# Patient Record
Sex: FEMALE | Race: WHITE | NOT HISPANIC OR LATINO | Employment: OTHER | URBAN - METROPOLITAN AREA
[De-identification: names, ages, dates, MRNs, and addresses within clinical notes are randomized per-mention and may not be internally consistent; named-entity substitution may affect disease eponyms.]

---

## 2017-07-07 ENCOUNTER — HOSPITAL ENCOUNTER (OUTPATIENT)
Dept: URGENT CARE | Facility: CLINIC | Age: 82
Discharge: HOME | End: 2017-07-07
Attending: FAMILY MEDICINE | Admitting: FAMILY MEDICINE

## 2020-02-13 ENCOUNTER — OUTSIDE FACILITY SERVICE (OUTPATIENT)
Dept: CARDIOLOGY | Facility: CLINIC | Age: 85
End: 2020-02-13

## 2020-02-13 PROCEDURE — 33208 INSRT HEART PM ATRIAL & VENT: CPT | Performed by: INTERNAL MEDICINE

## 2020-02-20 ENCOUNTER — CLINICAL SUPPORT NO REQUIREMENTS (OUTPATIENT)
Dept: CARDIOLOGY | Facility: CLINIC | Age: 85
End: 2020-02-20

## 2020-02-20 DIAGNOSIS — I49.5 SICK SINUS SYNDROME (HCC): Primary | ICD-10-CM

## 2020-02-20 PROCEDURE — 93280 PM DEVICE PROGR EVAL DUAL: CPT | Performed by: INTERNAL MEDICINE

## 2020-02-20 PROCEDURE — 99024 POSTOP FOLLOW-UP VISIT: CPT | Performed by: NURSE PRACTITIONER

## 2020-02-20 NOTE — PROGRESS NOTES
Cardiology Office Implant follow up      Reason for f/u: Incision check and device interrogation      Dear Dr. Greer, No Known and colleagues:    It was nice to see Emily Solano in follow up today 1 weeks after device implant.  As you know, she is a 84 y.o. female of Dr. Ballesteros with symptomatic sick sinus syndrome, recurrent atrial fibrillation, sinus pauses with syncope who underwent dual-chamber permanent pacemaker with right atrial lead and right ventricular pacing lead at [facility] by Dr. Schultz on 2/13/2020. The device was interrogated and has normal functionality, see attached for specifics.  Incision left chest wall is well approximated without erythema, edema, or drainage.        Past Medical History:   Diagnosis Date   • A-fib (CMS/HCC)    • Coronary artery disease    • Hyperlipidemia    • Hypertension        Past Surgical History:   Procedure Laterality Date   • BREAST LUMPECTOMY Right    • HYSTERECTOMY           Current Outpatient Medications:   •  benzonatate (TESSALON) 200 MG capsule, Take 1 capsule by mouth 3 (Three) Times a Day As Needed for Cough., Disp: 30 capsule, Rfl: 0  •  ELIQUIS 2.5 MG tablet tablet, Take 2.5 mg by mouth 2 (Two) Times a Day., Disp: , Rfl:   •  hydroCHLOROthiazide (HYDRODIURIL) 12.5 MG tablet, Take 12.5 mg by mouth Daily., Disp: , Rfl:   •  sotalol (BETAPACE) 80 MG tablet, 40 mg., Disp: , Rfl:     Social History     Socioeconomic History   • Marital status:      Spouse name: Not on file   • Number of children: Not on file   • Years of education: Not on file   • Highest education level: Not on file   Tobacco Use   • Smoking status: Never Smoker   • Smokeless tobacco: Never Used   Substance and Sexual Activity   • Alcohol use: Never     Frequency: Never   • Drug use: Never       No family history on file.          ROS  There were no vitals taken for this visit..  Objective     Physical Exam      Procedures          Assessment/Plan:    Return to office  3 weeks for 1 month post implant check.      Patient received verbal instruction to leave open to air, leave steri strips in place (they will fall off on their own).  May shower after 1 week from implant but cover with waterproof dressing until 2 weeks from implant.  Do not lift more than 5 lbs, excessive movement, or raise arm above head with surgical arm until 1 month from implant.       NATASHA Peralta  EP cardiology

## 2020-02-26 ENCOUNTER — OFFICE (OUTPATIENT)
Dept: URBAN - METROPOLITAN AREA CLINIC 64 | Facility: CLINIC | Age: 85
End: 2020-02-26
Payer: OTHER GOVERNMENT

## 2020-02-26 VITALS
WEIGHT: 137 LBS | SYSTOLIC BLOOD PRESSURE: 164 MMHG | DIASTOLIC BLOOD PRESSURE: 82 MMHG | HEART RATE: 70 BPM | HEIGHT: 64 IN

## 2020-02-26 DIAGNOSIS — K21.9 GASTRO-ESOPHAGEAL REFLUX DISEASE WITHOUT ESOPHAGITIS: ICD-10-CM

## 2020-02-26 DIAGNOSIS — R11.2 NAUSEA WITH VOMITING, UNSPECIFIED: ICD-10-CM

## 2020-02-26 DIAGNOSIS — Z79.01 LONG TERM (CURRENT) USE OF ANTICOAGULANTS: ICD-10-CM

## 2020-02-26 DIAGNOSIS — Z95.0 PRESENCE OF CARDIAC PACEMAKER: ICD-10-CM

## 2020-02-26 PROCEDURE — 99202 OFFICE O/P NEW SF 15 MIN: CPT | Performed by: NURSE PRACTITIONER

## 2020-02-26 RX ORDER — ONDANSETRON HYDROCHLORIDE 4 MG/1
16 TABLET, FILM COATED ORAL
Qty: 40 | Refills: 1 | Status: COMPLETED
Start: 2020-02-26 | End: 2020-08-06

## 2020-02-26 NOTE — SERVICEHPINOTES
Pt is an 83yo female with hx of afib on eliquis, cardiac pacemaker placement 2 weeks ago, and colon polyps in 1970's who presents in consultation from Dr. Agosto for evaluation of gerd, can be mild to moderate, did not improve with any otc acid reducers in the past.  she thinks she was told she doesn't make enough acid in the past.  she is unsure of specific aggravating or alleviating factors.  For at least a few years she c/o mild to moderate intermittent dysphagia with all consistency of oral intake, is unsure of alleviating factors, thinks it is worse with apples.  Sometimes she c/o nausea with bilious emesis after food feels like it has gotten stuck.  Denies prior egd or esophageal dilation.  No hematemesis.  No abd pain.  She is having regular bm with occasional constipation.  No fever or wt loss.Pt's son has barretts esophagus.BR

## 2020-03-12 ENCOUNTER — CLINICAL SUPPORT NO REQUIREMENTS (OUTPATIENT)
Dept: CARDIOLOGY | Facility: CLINIC | Age: 85
End: 2020-03-12

## 2020-03-12 DIAGNOSIS — I49.5 SICK SINUS SYNDROME (HCC): Primary | ICD-10-CM

## 2020-03-12 PROCEDURE — 99024 POSTOP FOLLOW-UP VISIT: CPT | Performed by: INTERNAL MEDICINE

## 2020-03-12 PROCEDURE — 93288 INTERROG EVL PM/LDLS PM IP: CPT | Performed by: INTERNAL MEDICINE

## 2020-03-12 NOTE — PROGRESS NOTES
Patient here for one month incision check. S/P [employer] dual pacemaker implanted on 2/13/20. Steri strips removed. Incision intact, clean, dry, no signs of infection. Patient made aware she can to her normal daily activities. Next appointment already scheduled. Patient verbalized understanding. In clinic interrogation performed with assistance of rep. See scanned report.  NO changes made and NO episodes.

## 2020-03-16 ENCOUNTER — CLINICAL SUPPORT NO REQUIREMENTS (OUTPATIENT)
Dept: CARDIOLOGY | Facility: CLINIC | Age: 85
End: 2020-03-16

## 2020-03-16 DIAGNOSIS — I49.5 SICK SINUS SYNDROME (HCC): Primary | ICD-10-CM

## 2020-04-23 RX ORDER — DILTIAZEM HYDROCHLORIDE 240 MG/1
240 CAPSULE, COATED, EXTENDED RELEASE ORAL EVERY MORNING
COMMUNITY
End: 2021-01-11

## 2020-04-23 RX ORDER — LOSARTAN POTASSIUM 100 MG/1
50 TABLET ORAL DAILY
COMMUNITY
End: 2021-01-11

## 2020-04-29 ENCOUNTER — ANESTHESIA EVENT (OUTPATIENT)
Dept: GASTROENTEROLOGY | Facility: HOSPITAL | Age: 85
End: 2020-04-29

## 2020-04-30 ENCOUNTER — ANESTHESIA (OUTPATIENT)
Dept: GASTROENTEROLOGY | Facility: HOSPITAL | Age: 85
End: 2020-04-30

## 2020-04-30 ENCOUNTER — HOSPITAL ENCOUNTER (OUTPATIENT)
Facility: HOSPITAL | Age: 85
Setting detail: HOSPITAL OUTPATIENT SURGERY
Discharge: HOME | End: 2020-04-30
Attending: INTERNAL MEDICINE | Admitting: INTERNAL MEDICINE

## 2020-04-30 ENCOUNTER — ON CAMPUS - OUTPATIENT (OUTPATIENT)
Dept: URBAN - METROPOLITAN AREA HOSPITAL 85 | Facility: HOSPITAL | Age: 85
End: 2020-04-30
Payer: OTHER GOVERNMENT

## 2020-04-30 VITALS
DIASTOLIC BLOOD PRESSURE: 64 MMHG | HEART RATE: 61 BPM | RESPIRATION RATE: 12 BRPM | SYSTOLIC BLOOD PRESSURE: 112 MMHG | TEMPERATURE: 97.6 F | OXYGEN SATURATION: 95 % | BODY MASS INDEX: 21.66 KG/M2 | HEIGHT: 65 IN | WEIGHT: 130 LBS

## 2020-04-30 DIAGNOSIS — R13.10 DYSPHAGIA, UNSPECIFIED: ICD-10-CM

## 2020-04-30 DIAGNOSIS — K21.0 GASTRO-ESOPHAGEAL REFLUX DISEASE WITH ESOPHAGITIS: ICD-10-CM

## 2020-04-30 DIAGNOSIS — R11.2 NAUSEA WITH VOMITING, UNSPECIFIED: ICD-10-CM

## 2020-04-30 PROCEDURE — 43235 EGD DIAGNOSTIC BRUSH WASH: CPT | Performed by: NURSE PRACTITIONER

## 2020-04-30 PROCEDURE — 43450 DILATE ESOPHAGUS 1/MULT PASS: CPT | Performed by: NURSE PRACTITIONER

## 2020-04-30 PROCEDURE — 25010000002 PROPOFOL 10 MG/ML EMULSION: Performed by: ANESTHESIOLOGY

## 2020-04-30 RX ORDER — SODIUM CHLORIDE 9 MG/ML
1000 INJECTION, SOLUTION INTRAVENOUS CONTINUOUS
Status: DISCONTINUED | OUTPATIENT
Start: 2020-04-30 | End: 2020-04-30 | Stop reason: HOSPADM

## 2020-04-30 RX ORDER — PROPOFOL 10 MG/ML
VIAL (ML) INTRAVENOUS AS NEEDED
Status: DISCONTINUED | OUTPATIENT
Start: 2020-04-30 | End: 2020-04-30 | Stop reason: SURG

## 2020-04-30 RX ORDER — LIDOCAINE HYDROCHLORIDE 10 MG/ML
0.5 INJECTION, SOLUTION INFILTRATION; PERINEURAL ONCE AS NEEDED
Status: DISCONTINUED | OUTPATIENT
Start: 2020-04-30 | End: 2020-04-30 | Stop reason: HOSPADM

## 2020-04-30 RX ORDER — SODIUM CHLORIDE 0.9 % (FLUSH) 0.9 %
10 SYRINGE (ML) INJECTION AS NEEDED
Status: DISCONTINUED | OUTPATIENT
Start: 2020-04-30 | End: 2020-04-30 | Stop reason: HOSPADM

## 2020-04-30 RX ORDER — SODIUM CHLORIDE 0.9 % (FLUSH) 0.9 %
3 SYRINGE (ML) INJECTION EVERY 12 HOURS SCHEDULED
Status: DISCONTINUED | OUTPATIENT
Start: 2020-04-30 | End: 2020-04-30 | Stop reason: HOSPADM

## 2020-04-30 RX ADMIN — PROPOFOL 100 MG: 10 INJECTION, EMULSION INTRAVENOUS at 09:52

## 2020-04-30 RX ADMIN — SODIUM CHLORIDE 1000 ML: 0.9 INJECTION, SOLUTION INTRAVENOUS at 08:57

## 2020-04-30 NOTE — ANESTHESIA PREPROCEDURE EVALUATION
Anesthesia Evaluation     Patient summary reviewed and Nursing notes reviewed   no history of anesthetic complications:  NPO Solid Status: > 8 hours  NPO Liquid Status: > 8 hours           Airway   Mallampati: II  TM distance: >3 FB  Neck ROM: full  No difficulty expected  Dental      Pulmonary - normal exam   (+) shortness of breath,   Cardiovascular - normal exam    (+) pacemaker pacemaker interrogated unknown, hypertension, CAD, dysrhythmias (sick sinus syndrome),       Neuro/Psych- negative ROS  GI/Hepatic/Renal/Endo - negative ROS     Musculoskeletal (-) negative ROS    Abdominal  - normal exam   Substance History - negative use     OB/GYN negative ob/gyn ROS         Other      history of cancer remission                    Anesthesia Plan    ASA 3     MAC     intravenous induction     Anesthetic plan, all risks, benefits, and alternatives have been provided, discussed and informed consent has been obtained with: patient.

## 2020-04-30 NOTE — ANESTHESIA POSTPROCEDURE EVALUATION
Patient: Emily Solano    Procedure Summary     Date:  04/30/20 Room / Location:  [facility] ENDOSCOPY 4 / [facility] ENDOSCOPY    Anesthesia Start:  0949 Anesthesia Stop:  1004    Procedure:  ESOPHAGOGASTRODUODENOSCOPY WITH DILATATION (BOUGIE #48,52) (N/A ) Diagnosis:       GERD (gastroesophageal reflux disease)      Dysphagia      Nausea with vomiting      Long term (current) use of anticoagulants      Cardiac pacemaker in situ      (GERD, DYSPHAGIA, N/V, LONG TERM USE OF ANTICOAGULANTS, CARDIAC PACEMAKER IS SITU)    Surgeon:  Jeovanny Jonas MD Provider:  Isac Henderson MD    Anesthesia Type:  MAC ASA Status:  3          Anesthesia Type: MAC    Vitals  Vitals Value Taken Time   /64 4/30/2020 10:22 AM   Temp     Pulse 61 4/30/2020 10:22 AM   Resp 12 4/30/2020 10:22 AM   SpO2 95 % 4/30/2020 10:22 AM           Post Anesthesia Care and Evaluation    Patient location during evaluation: PACU  Patient participation: complete - patient participated  Level of consciousness: awake  Pain scale: See nurse's notes for pain score.  Pain management: adequate  Airway patency: patent  Anesthetic complications: No anesthetic complications  PONV Status: none  Cardiovascular status: acceptable  Respiratory status: acceptable  Hydration status: acceptable    Comments: Patient seen and examined postoperatively; vital signs stable; SpO2 greater than or equal to 90%; cardiopulmonary status stable; nausea/vomiting adequately controlled; pain adequately controlled; no apparent anesthesia complications; patient discharged from anesthesia care when discharge criteria were met

## 2020-05-04 ENCOUNTER — OFFICE VISIT (OUTPATIENT)
Dept: CARDIOLOGY | Facility: CLINIC | Age: 85
End: 2020-05-04

## 2020-05-04 ENCOUNTER — CLINICAL SUPPORT NO REQUIREMENTS (OUTPATIENT)
Dept: CARDIOLOGY | Facility: CLINIC | Age: 85
End: 2020-05-04

## 2020-05-04 VITALS
WEIGHT: 137 LBS | DIASTOLIC BLOOD PRESSURE: 90 MMHG | OXYGEN SATURATION: 98 % | BODY MASS INDEX: 23.15 KG/M2 | HEART RATE: 65 BPM | SYSTOLIC BLOOD PRESSURE: 165 MMHG

## 2020-05-04 DIAGNOSIS — I49.5 SICK SINUS SYNDROME (HCC): Primary | ICD-10-CM

## 2020-05-04 DIAGNOSIS — I10 ESSENTIAL HYPERTENSION: ICD-10-CM

## 2020-05-04 DIAGNOSIS — I50.30 DIASTOLIC CONGESTIVE HEART FAILURE, UNSPECIFIED HF CHRONICITY (HCC): ICD-10-CM

## 2020-05-04 DIAGNOSIS — Z95.0 PRESENCE OF CARDIAC PACEMAKER: ICD-10-CM

## 2020-05-04 PROCEDURE — 93280 PM DEVICE PROGR EVAL DUAL: CPT | Performed by: INTERNAL MEDICINE

## 2020-05-04 PROCEDURE — 99214 OFFICE O/P EST MOD 30 MIN: CPT | Performed by: INTERNAL MEDICINE

## 2020-05-04 RX ORDER — TRIAMTERENE AND HYDROCHLOROTHIAZIDE 75; 50 MG/1; MG/1
0.5 TABLET ORAL DAILY
Qty: 30 TABLET | Refills: 5 | Status: SHIPPED | OUTPATIENT
Start: 2020-05-04 | End: 2020-08-03

## 2020-05-04 RX ORDER — SOTALOL HYDROCHLORIDE 80 MG/1
80 TABLET ORAL 2 TIMES DAILY
Qty: 60 TABLET | Refills: 5 | Status: SHIPPED | OUTPATIENT
Start: 2020-05-04 | End: 2020-06-03

## 2020-05-04 NOTE — PROGRESS NOTES
CC--sick sinus syndrome, paroxysmal atrial fibrillation, hypertension    Sub--84-year-old female patient with symptomatic sick sinus syndrome with syncope was seen few months ago at [facility] and underwent a dual-chamber pacemaker manufactured by [employer] and comes in for follow-up.  She continues to have issues with high blood pressure and exertional shortness of breath without any leg edema or syncope  She denies any angina or leg edema  She is very compliant with medications and currently is not taking hydrochlorothiazide for few months because of prior history of mild hypokalemia  She is currently on calcium channel blockers and low-dose of sotalol and anticoagulation apart from no certain  Predominant symptoms include exertional shortness of breath and fatigue consistent with class III diastolic heart failure symptoms      Past history is verified and attached and unchanged  Past Medical History:   Diagnosis Date   • A-fib (CMS/[facility])     dr. mtz- seen in hospital and has f/u in office in may, 2020   • Cancer (CMS/[facility]) 2013    HX breast cancer   • Constipation    • Coronary artery disease     dr. mtz   • Dyspnea    • Hypertension    • Macular degeneration of both eyes    • Varicose vein of leg    • Vomiting bile     occassionally     Past Surgical History:   Procedure Laterality Date   • BREAST LUMPECTOMY Right    • ENDOSCOPY N/A 4/30/2020    Procedure: ESOPHAGOGASTRODUODENOSCOPY WITH DILATATION (BOUGIE #48,52);  Surgeon: Jeovanny Jonas MD;  Location: [facility] ENDOSCOPY;  Service: Gastroenterology;  Laterality: N/A;  post procedure diagnosis: ESOPHAGITIS   • HYSTERECTOMY      partial   • PACEMAKER IMPLANTATION  02/13/2020    at [place]     History reviewed. No pertinent family history.  Social History     Tobacco Use   • Smoking status: Never Smoker   • Smokeless tobacco: Never Used   Substance Use Topics   • Alcohol use: Never     Frequency: Never   • Drug use: Never     Allergies:   Prednisone; Codeine; Wheat; and Black walnut pollen allergy skin test    Review of Systems   General:  positive for fatigue and tiredness  Eyes: No redness  Cardiovascular: No chest pain, no palpitations  Respiratory:   positive for class 3 shortness of breath  Gastrointestinal: No nausea or vomiting, bleeding  Genitourinary: no hematuria or dysuria  Musculoskeletal: No arthralgia or myalgia  Skin: No rash  Neurologic: No numbness, tingling, syncope  Hematologic/Lymphatic: No abnormal bleeding      Physical Exam  VITALS REVIEWED--blood pressure 165/90 pulse rate is 65 patient is afebrile respiration 12 times a minute and pacemaker site is clean    General:      well developed, well nourished, in no acute distress.    Head:      normocephalic and atraumatic.    Eyes:      PERRL/EOM intact, conjunctiva and sclera clear with out nystagmus.    Neck:      no  thyromegaly,  trachea central with normal respiratory effort and PMI displaced laterally  Lungs:      clear bilaterally to auscultation.    Heart:       underlying sinus rhythm  without any murmurs gallops or rubs  Msk:      no deformity or scoliosis noted of thoracic or lumbar spine.    Pulses:      pulses normal in all 4 extremities.    Extremities:       no cyanosis or clubbing--trace left pedal edema and trace right pedal edema.    Neurologic:      no focal deficits.   alert oriented x3  Skin:      intact without lesions or rashes.    Psych:      alert and cooperative; normal mood and affect; normal attention span and concentration.          Assessment and plan    Class III diastolic heart failure symptoms ongoing  Hypertensive heart disease with prior history of mild hypokalemia and hypertension not well controlled  Sick sinus syndrome  Paroxysmal atrial fibrillation noted up to 16% on pacemaker interrogation  Dual-chamber pacemaker in situ      Pacemaker appropriately functioning and reprogramming attached to the chart  Increase sotalol to 80 mg twice daily to  suppress atrial arrhythmias  Start half tablet of triamterene hydrochlorothiazide for optimization of hypertension  Check potassium and BMP in 2 weeks  Reevaluate this patient in 3 months    Electronically signed by Marcus Schultz MD, 05/04/20, 12:00 PM.

## 2020-05-04 NOTE — PROGRESS NOTES
In clinic device interrogation with Dr. Schultz. See scanned report. Several episodes of A. Fib lasting only 10-15 seconds. Changes per Dr. Schultz. Charges per his clinic.

## 2020-08-03 ENCOUNTER — OFFICE VISIT (OUTPATIENT)
Dept: CARDIOLOGY | Facility: CLINIC | Age: 85
End: 2020-08-03

## 2020-08-03 ENCOUNTER — CLINICAL SUPPORT NO REQUIREMENTS (OUTPATIENT)
Dept: CARDIOLOGY | Facility: CLINIC | Age: 85
End: 2020-08-03

## 2020-08-03 VITALS
BODY MASS INDEX: 22.98 KG/M2 | SYSTOLIC BLOOD PRESSURE: 150 MMHG | DIASTOLIC BLOOD PRESSURE: 111 MMHG | HEART RATE: 71 BPM | OXYGEN SATURATION: 97 % | WEIGHT: 136 LBS

## 2020-08-03 DIAGNOSIS — I48.0 PAROXYSMAL ATRIAL FIBRILLATION (HCC): ICD-10-CM

## 2020-08-03 DIAGNOSIS — I49.5 SICK SINUS SYNDROME (HCC): Primary | ICD-10-CM

## 2020-08-03 DIAGNOSIS — Z95.0 PRESENCE OF CARDIAC PACEMAKER: ICD-10-CM

## 2020-08-03 DIAGNOSIS — I50.30 DIASTOLIC CONGESTIVE HEART FAILURE, UNSPECIFIED HF CHRONICITY (HCC): ICD-10-CM

## 2020-08-03 DIAGNOSIS — R00.2 PALPITATIONS: ICD-10-CM

## 2020-08-03 DIAGNOSIS — I10 ESSENTIAL HYPERTENSION: ICD-10-CM

## 2020-08-03 PROCEDURE — 99214 OFFICE O/P EST MOD 30 MIN: CPT | Performed by: INTERNAL MEDICINE

## 2020-08-03 PROCEDURE — 93280 PM DEVICE PROGR EVAL DUAL: CPT | Performed by: INTERNAL MEDICINE

## 2020-08-03 PROCEDURE — 93000 ELECTROCARDIOGRAM COMPLETE: CPT | Performed by: INTERNAL MEDICINE

## 2020-08-03 RX ORDER — TRIAMTERENE AND HYDROCHLOROTHIAZIDE 75; 50 MG/1; MG/1
1 TABLET ORAL DAILY
COMMUNITY
End: 2020-12-07

## 2020-08-03 RX ORDER — SOTALOL HYDROCHLORIDE 120 MG/1
120 TABLET ORAL 2 TIMES DAILY
Qty: 60 TABLET | Refills: 3 | Status: SHIPPED | OUTPATIENT
Start: 2020-08-03 | End: 2020-12-09

## 2020-08-03 RX ORDER — SOTALOL HYDROCHLORIDE 80 MG/1
40 TABLET ORAL 2 TIMES DAILY
COMMUNITY
End: 2020-08-03 | Stop reason: DRUGHIGH

## 2020-08-03 RX ORDER — HYDROCHLOROTHIAZIDE 12.5 MG/1
12.5 CAPSULE, GELATIN COATED ORAL DAILY
COMMUNITY
End: 2020-08-03

## 2020-08-03 NOTE — PROGRESS NOTES
In clinic device interrogation. See scanned report. No new episodes. Battery Ok. Patient does have a home monitor on a transmission schedule. Charges per Dr. Schultz's clinic.

## 2020-08-03 NOTE — PROGRESS NOTES
CC--sick sinus syndrome, paroxysmal atrial fibrillation, hypertension    Sub--84-year-old female patient with symptomatic sick sinus syndrome with syncope was seen few months ago at [facility] and underwent a dual-chamber pacemaker manufactured by [employer] and comes in for follow-up.  She continues to have issues with high blood pressure and exertional shortness of breath without any leg edema or syncope  She denies any angina or leg edema  She is very compliant with medications and currently is not taking hydrochlorothiazide for few months because of prior history of mild hypokalemia  She is currently on calcium channel blockers and low-dose of sotalol and anticoagulation   Patient symptoms somewhat improved since last visit with lesser episodes of atrial fibrillation according to her and currently she is on sotalol 80 mg twice daily  Blood pressure according to her is well controlled at home recordings      Past history is verified and attached and unchanged  Past Medical History:   Diagnosis Date   • A-fib (CMS/[facility])     dr. mtz- seen in hospital and has f/u in office in may, 2020   • Cancer (CMS/[facility]) 2013    HX breast cancer   • Constipation    • Coronary artery disease     dr. mtz   • Dyspnea    • Hypertension    • Macular degeneration of both eyes    • Varicose vein of leg    • Vomiting bile     occassionally     Past Surgical History:   Procedure Laterality Date   • BREAST LUMPECTOMY Right    • ENDOSCOPY N/A 4/30/2020    Procedure: ESOPHAGOGASTRODUODENOSCOPY WITH DILATATION (BOUGIE #48,52);  Surgeon: Jeovanny Jonas MD;  Location: [facility] ENDOSCOPY;  Service: Gastroenterology;  Laterality: N/A;  post procedure diagnosis: ESOPHAGITIS   • HYSTERECTOMY      partial   • PACEMAKER IMPLANTATION  02/13/2020    at [place]     History reviewed. No pertinent family history.  Social History     Tobacco Use   • Smoking status: Never Smoker   • Smokeless tobacco: Never Used   Substance Use Topics    • Alcohol use: Never     Frequency: Never   • Drug use: Never       Review of Systems   General:  positive for fatigue and tiredness  Eyes: No redness  Cardiovascular: No chest pain, no palpitations  Respiratory:   positive for class 2 shortness of breath  Gastrointestinal: No nausea or vomiting, bleeding  Genitourinary: no hematuria or dysuria  Musculoskeletal: No arthralgia or myalgia  Skin: No rash  Neurologic: No numbness, tingling, syncope  Hematologic/Lymphatic: No abnormal bleeding      Physical Exam  VITALS REVIEWED--blood pressure 150/111 pulse rate is 65 patient is afebrile respiration 12 times a minute and pacemaker site is clean  EKG shows sinus rhythm with atrial pacing and nonspecific ST-T wave changes normal electrolytes with artifacts QRS of 91 and QTC is 460 and no significant changes compared to prior EKG and EKG indication includes atrial arrhythmias and sotalol therapy    General:      well developed, well nourished, in no acute distress.    Head:      normocephalic and atraumatic.    Eyes:      PERRL/EOM intact, conjunctiva and sclera clear with out nystagmus.    Neck:      no  thyromegaly,  trachea central with normal respiratory effort and PMI displaced laterally  Lungs:      clear bilaterally to auscultation.    Heart:       underlying sinus rhythm  without any murmurs gallops or rubs  Msk:      no deformity or scoliosis noted of thoracic or lumbar spine.    Pulses:      pulses normal in all 4 extremities.    Extremities:       no cyanosis or clubbing--trace left pedal edema and trace right pedal edema.    Neurologic:      no focal deficits.   alert oriented x3  Skin:      intact without lesions or rashes.    Psych:      alert and cooperative; normal mood and affect; normal attention span and concentration.          Assessment and plan    Class II diastolic heart failure symptoms ongoing  Hypertensive heart disease with prior history of mild hypokalemia and hypertension not well  controlled--patient claims her blood pressures well controlled at home  Sick sinus syndrome  Paroxysmal atrial fibrillation noted up to 9% on pacemaker interrogation  Dual-chamber pacemaker in situ  Titrate sotalol to 120 mg twice daily to optimize her antiarrhythmic treatment and follow-up in few months and medications reviewed      Electronically signed by Marcus Schultz MD, 08/03/20, 12:42 PM.

## 2020-08-06 ENCOUNTER — OFFICE (OUTPATIENT)
Dept: URBAN - METROPOLITAN AREA CLINIC 64 | Facility: CLINIC | Age: 85
End: 2020-08-06
Payer: OTHER GOVERNMENT

## 2020-08-06 VITALS
SYSTOLIC BLOOD PRESSURE: 159 MMHG | HEART RATE: 71 BPM | WEIGHT: 137 LBS | DIASTOLIC BLOOD PRESSURE: 89 MMHG | HEIGHT: 64 IN

## 2020-08-06 DIAGNOSIS — R13.10 DYSPHAGIA, UNSPECIFIED: ICD-10-CM

## 2020-08-06 DIAGNOSIS — K21.9 GASTRO-ESOPHAGEAL REFLUX DISEASE WITHOUT ESOPHAGITIS: ICD-10-CM

## 2020-08-06 PROCEDURE — 99214 OFFICE O/P EST MOD 30 MIN: CPT | Performed by: INTERNAL MEDICINE

## 2020-08-06 RX ORDER — PANTOPRAZOLE SODIUM 40 MG/1
40 TABLET, DELAYED RELEASE ORAL
Qty: 90 | Refills: 3 | Status: COMPLETED
Start: 2020-08-06 | End: 2020-09-08

## 2020-09-03 ENCOUNTER — CLINICAL SUPPORT NO REQUIREMENTS (OUTPATIENT)
Dept: CARDIOLOGY | Facility: CLINIC | Age: 85
End: 2020-09-03

## 2020-09-03 ENCOUNTER — OFFICE VISIT (OUTPATIENT)
Dept: CARDIOLOGY | Facility: CLINIC | Age: 85
End: 2020-09-03

## 2020-09-03 VITALS
HEART RATE: 70 BPM | BODY MASS INDEX: 23.32 KG/M2 | SYSTOLIC BLOOD PRESSURE: 143 MMHG | OXYGEN SATURATION: 93 % | DIASTOLIC BLOOD PRESSURE: 83 MMHG | WEIGHT: 138 LBS

## 2020-09-03 DIAGNOSIS — I50.30 DIASTOLIC CONGESTIVE HEART FAILURE, UNSPECIFIED HF CHRONICITY (HCC): ICD-10-CM

## 2020-09-03 DIAGNOSIS — Z95.0 PRESENCE OF CARDIAC PACEMAKER: ICD-10-CM

## 2020-09-03 DIAGNOSIS — I48.0 PAROXYSMAL ATRIAL FIBRILLATION (HCC): ICD-10-CM

## 2020-09-03 DIAGNOSIS — I49.5 SICK SINUS SYNDROME (HCC): Primary | ICD-10-CM

## 2020-09-03 PROCEDURE — 93000 ELECTROCARDIOGRAM COMPLETE: CPT | Performed by: INTERNAL MEDICINE

## 2020-09-03 PROCEDURE — 99214 OFFICE O/P EST MOD 30 MIN: CPT | Performed by: INTERNAL MEDICINE

## 2020-09-03 RX ORDER — HYDROCHLOROTHIAZIDE 12.5 MG/1
12.5 TABLET ORAL DAILY
COMMUNITY
End: 2020-12-07

## 2020-09-03 NOTE — PROGRESS NOTES
CC--sick sinus syndrome, paroxysmal atrial fibrillation, hypertension    Sub--84-year-old female patient with symptomatic sick sinus syndrome with syncope was seen few months ago at [facility] and underwent a dual-chamber pacemaker manufactured by [employer] and comes in for follow-up.  She continues to have issues with high blood pressure and exertional shortness of breath without any leg edema or syncope  She denies any angina or leg edema  She is very compliant with medications and currently is not taking hydrochlorothiazide for few months because of prior history of mild hypokalemia  She is currently on calcium channel blockers and anticoagulation   Patient symptoms somewhat improved since last visit with lesser episodes of atrial fibrillation according to her and currently she is on sotalol 120 mg twice daily  Blood pressure according to her is well controlled at home recordings      Past history is verified and attached and unchanged  Past Medical History:   Diagnosis Date   • A-fib (CMS/[facility])     dr. mtz- seen in hospital and has f/u in office in may, 2020   • Cancer (CMS/[facility]) 2013    HX breast cancer   • Constipation    • Coronary artery disease     dr. mtz   • Dyspnea    • Hypertension    • Macular degeneration of both eyes    • Varicose vein of leg    • Vomiting bile     occassionally     Past Surgical History:   Procedure Laterality Date   • BREAST LUMPECTOMY Right    • ENDOSCOPY N/A 4/30/2020    Procedure: ESOPHAGOGASTRODUODENOSCOPY WITH DILATATION (BOUGIE #48,52);  Surgeon: Jeovanny Jonas MD;  Location: [facility] ENDOSCOPY;  Service: Gastroenterology;  Laterality: N/A;  post procedure diagnosis: ESOPHAGITIS   • HYSTERECTOMY      partial   • PACEMAKER IMPLANTATION  02/13/2020    at [place]     History reviewed. No pertinent family history.  Social History     Tobacco Use   • Smoking status: Never Smoker   • Smokeless tobacco: Never Used   Substance Use Topics   • Alcohol use: Never      Frequency: Never   • Drug use: Never       Review of Systems   General:  positive for fatigue and tiredness  Eyes: No redness  Cardiovascular: No chest pain, no palpitations  Respiratory:   positive for class 2 shortness of breath  Gastrointestinal: No nausea or vomiting, bleeding  Genitourinary: no hematuria or dysuria  Musculoskeletal: No arthralgia or myalgia  Skin: No rash  Neurologic: No numbness, tingling, syncope  Hematologic/Lymphatic: No abnormal bleeding      Physical Exam  VITALS REVIEWED--blood pressure 143/83 pulse rate is 70 patient is afebrile respiration 12 times a minute and pacemaker site is clean      ECG 12 Lead  Date/Time: 9/3/2020 5:35 PM  Performed by: Marcus Schultz MD  Authorized by: Marcus Schultz MD   Comparison: compared with previous ECG   Rhythm: sinus rhythm  Rate: normal  BPM: 60  Comments: Sinus rhythm with atrial pacing with a rate of 60 KS interval of 211 QTC is 4 7 5 ms no significant EKG changes compared to prior EKG and EKG indication includes atrial fibrillation and sotalol therapy              General:      well developed, well nourished, in no acute distress.    Head:      normocephalic and atraumatic.    Eyes:      PERRL/EOM intact, conjunctiva and sclera clear with out nystagmus.    Neck:      no  thyromegaly,  trachea central with normal respiratory effort and PMI displaced laterally  Lungs:      clear bilaterally to auscultation.    Heart:       underlying sinus rhythm  without any murmurs gallops or rubs  Msk:      no deformity or scoliosis noted of thoracic or lumbar spine.    Pulses:      pulses normal in all 4 extremities.    Extremities:       no cyanosis or clubbing--trace left pedal edema and trace right pedal edema.    Neurologic:      no focal deficits.   alert oriented x3  Skin:      intact without lesions or rashes.    Psych:      alert and cooperative; normal mood and affect; normal attention span and concentration.          Assessment and  plan    Class II diastolic heart failure symptoms ongoing  Hypertensive heart disease with prior history of mild hypokalemia and hypertension  well controlled  Sick sinus syndrome  Paroxysmal atrial fibrillation noted up to 10% on pacemaker interrogation  Dual-chamber pacemaker in situ  Symptomatically feels better after increasing sotalol dose  Continue current medications and follow-up in few months    Electronically signed by Marcus Schultz MD, 09/03/20, 5:37 PM.

## 2020-09-04 NOTE — PROGRESS NOTES
In clinic device interrogation. See scanned report. No new episodes. Charges per Dr. Schultz's clinic.

## 2020-12-07 ENCOUNTER — CLINICAL SUPPORT NO REQUIREMENTS (OUTPATIENT)
Dept: CARDIOLOGY | Facility: CLINIC | Age: 85
End: 2020-12-07

## 2020-12-07 ENCOUNTER — OFFICE VISIT (OUTPATIENT)
Dept: CARDIOLOGY | Facility: CLINIC | Age: 85
End: 2020-12-07

## 2020-12-07 VITALS
WEIGHT: 136 LBS | HEART RATE: 102 BPM | SYSTOLIC BLOOD PRESSURE: 126 MMHG | OXYGEN SATURATION: 97 % | BODY MASS INDEX: 22.98 KG/M2 | DIASTOLIC BLOOD PRESSURE: 87 MMHG

## 2020-12-07 DIAGNOSIS — I49.5 SICK SINUS SYNDROME (HCC): Primary | ICD-10-CM

## 2020-12-07 DIAGNOSIS — I10 ESSENTIAL HYPERTENSION: ICD-10-CM

## 2020-12-07 DIAGNOSIS — I48.0 PAROXYSMAL ATRIAL FIBRILLATION (HCC): ICD-10-CM

## 2020-12-07 DIAGNOSIS — R00.2 PALPITATIONS: ICD-10-CM

## 2020-12-07 DIAGNOSIS — I50.30 DIASTOLIC CONGESTIVE HEART FAILURE, UNSPECIFIED HF CHRONICITY (HCC): ICD-10-CM

## 2020-12-07 DIAGNOSIS — Z95.0 PRESENCE OF CARDIAC PACEMAKER: Primary | ICD-10-CM

## 2020-12-07 DIAGNOSIS — I49.5 SICK SINUS SYNDROME (HCC): ICD-10-CM

## 2020-12-07 PROCEDURE — 93000 ELECTROCARDIOGRAM COMPLETE: CPT | Performed by: INTERNAL MEDICINE

## 2020-12-07 PROCEDURE — 99214 OFFICE O/P EST MOD 30 MIN: CPT | Performed by: INTERNAL MEDICINE

## 2020-12-07 NOTE — PROGRESS NOTES
CC--sick sinus syndrome, paroxysmal atrial fibrillation, hypertension    Sub--85-year-old female patient with symptomatic sick sinus syndrome with syncope was seen few months ago at [facility] and underwent a dual-chamber pacemaker manufactured by [employer] in 2/2020.  She continues to have issues with  blood pressure and exertional shortness of breath without any leg edema or syncope  She denies any angina or leg edema  She is very compliant with medications  She is currently on calcium channel blockers and anticoagulation   Patient symptoms somewhat improved since last visit with lesser episodes of atrial fibrillation according to her and currently she is on sotalol 80 mg twice daily  She complains of class III dyspnea which is chronic in nature and orthostatic symptoms and she is taking hydrochlorothiazide 3 times a week  She complains of intermittent palpitations    Past history is verified and attached and unchanged  Past Medical History:   Diagnosis Date   • A-fib (CMS/[facility])     dr. mtz- seen in hospital and has f/u in office in may, 2020   • Cancer (CMS/[facility]) 2013    HX breast cancer   • Constipation    • Coronary artery disease     dr. mtz   • Dyspnea    • Hypertension    • Macular degeneration of both eyes    • Varicose vein of leg    • Vomiting bile     occassionally     Past Surgical History:   Procedure Laterality Date   • BREAST LUMPECTOMY Right    • ENDOSCOPY N/A 4/30/2020    Procedure: ESOPHAGOGASTRODUODENOSCOPY WITH DILATATION (BOUGIE #48,52);  Surgeon: Jeovanny Jonas MD;  Location: [facility] ENDOSCOPY;  Service: Gastroenterology;  Laterality: N/A;  post procedure diagnosis: ESOPHAGITIS   • HYSTERECTOMY      partial   • PACEMAKER IMPLANTATION  02/13/2020    at [place]     Review of Systems   General:  positive for fatigue and tiredness  Eyes: No redness  Cardiovascular: No chest pain, positive for  palpitations  Respiratory:   positive for class 2 shortness of  breath  Gastrointestinal: No nausea or vomiting, bleeding  Genitourinary: no hematuria or dysuria  Musculoskeletal: No arthralgia or myalgia  Skin: No rash  Neurologic: No numbness, tingling, syncope  Hematologic/Lymphatic: No abnormal bleeding      Physical Exam  VITALS REVIEWED--blood pressure 126/87 pulse rate is 102 patient is afebrile respiration 12 times a minute and pacemaker site is clean      General:      well developed, well nourished, in no acute distress.    Head:      normocephalic and atraumatic.    Eyes:      PERRL/EOM intact, conjunctiva and sclera clear with out nystagmus.    Neck:      no  thyromegaly,  trachea central with normal respiratory effort and PMI displaced laterally  Lungs:      clear bilaterally to auscultation.    Heart:       underlying atrial fibrillation   without any murmurs gallops or rubs  Msk:      no deformity or scoliosis noted of thoracic or lumbar spine.    Pulses:      pulses normal in all 4 extremities.    Extremities:       no cyanosis or clubbing--trace left pedal edema and trace right pedal edema.    Neurologic:      no focal deficits.   alert oriented x3  Skin:      intact without lesions or rashes.    Psych:      alert and cooperative; normal mood and affect; normal attention span and concentration.          Assessment and plan    Class II diastolic heart failure symptoms ongoing  Hypertensive heart disease with prior history of mild hypokalemia and hypertension  well controlled  Sick sinus syndrome  Paroxysmal atrial fibrillation noted up to 8% on pacemaker interrogation with ongoing atrial fibrillation with started this morning  Dual-chamber pacemaker in situ  Orthostatic hypotension  Stop hydrochlorothiazide  Plenty of oral hydration educated  Check CBC and CMP  Follow-up in 1 month  Prior noninvasive work-up in 2017 showed normal EF without ischemia with trace to mild MR  Medications reviewed with the patient and family        ECG 12 Lead    Date/Time: 12/7/2020  9:42 AM  Performed by: Marcus Schultz MD  Authorized by: Marcus Schultz MD   Comparison: compared with previous ECG   Comparison to previous ECG: Compared to prior EKG when patient was in sinus rhythm currently patient is in atrial fibrillation  Rhythm: atrial fibrillation  Rate: tachycardic  Conduction: conduction normal  QRS axis: normal  Other findings: non-specific ST-T wave changes          Electronically signed by Marcus Schultz MD, 12/07/20, 9:42 AM EST.

## 2020-12-09 RX ORDER — SOTALOL HYDROCHLORIDE 80 MG/1
TABLET ORAL
Qty: 180 TABLET | Refills: 1 | Status: SHIPPED | OUTPATIENT
Start: 2020-12-09

## 2021-01-11 ENCOUNTER — OFFICE VISIT (OUTPATIENT)
Dept: CARDIOLOGY | Facility: CLINIC | Age: 86
End: 2021-01-11

## 2021-01-11 VITALS
HEIGHT: 64 IN | OXYGEN SATURATION: 95 % | BODY MASS INDEX: 23.22 KG/M2 | RESPIRATION RATE: 18 BRPM | SYSTOLIC BLOOD PRESSURE: 177 MMHG | HEART RATE: 77 BPM | DIASTOLIC BLOOD PRESSURE: 100 MMHG | WEIGHT: 136 LBS

## 2021-01-11 DIAGNOSIS — R00.2 PALPITATIONS: ICD-10-CM

## 2021-01-11 DIAGNOSIS — I48.0 PAROXYSMAL ATRIAL FIBRILLATION (HCC): ICD-10-CM

## 2021-01-11 DIAGNOSIS — I49.5 SICK SINUS SYNDROME (HCC): Primary | ICD-10-CM

## 2021-01-11 DIAGNOSIS — I10 ESSENTIAL HYPERTENSION: ICD-10-CM

## 2021-01-11 DIAGNOSIS — I50.30 DIASTOLIC CONGESTIVE HEART FAILURE, UNSPECIFIED HF CHRONICITY (HCC): ICD-10-CM

## 2021-01-11 DIAGNOSIS — Z95.0 PRESENCE OF CARDIAC PACEMAKER: ICD-10-CM

## 2021-01-11 PROCEDURE — 99214 OFFICE O/P EST MOD 30 MIN: CPT | Performed by: INTERNAL MEDICINE

## 2021-01-11 PROCEDURE — 93000 ELECTROCARDIOGRAM COMPLETE: CPT | Performed by: INTERNAL MEDICINE

## 2021-01-11 RX ORDER — LOSARTAN POTASSIUM 50 MG/1
50 TABLET ORAL 2 TIMES DAILY
COMMUNITY
Start: 2020-12-17

## 2021-01-11 RX ORDER — SPIRONOLACTONE 25 MG/1
25 TABLET ORAL DAILY
Qty: 30 TABLET | Refills: 11 | Status: SHIPPED | OUTPATIENT
Start: 2021-01-11 | End: 2021-06-08

## 2021-01-11 RX ORDER — SPIRONOLACTONE 25 MG/1
25 TABLET ORAL DAILY
Qty: 30 TABLET | Refills: 11 | Status: SHIPPED | OUTPATIENT
Start: 2021-01-11 | End: 2021-01-11 | Stop reason: SDUPTHER

## 2021-01-11 NOTE — PROGRESS NOTES
CC--sick sinus syndrome, paroxysmal atrial fibrillation, hypertension    Sub--85-year-old female patient with symptomatic sick sinus syndrome with syncope was seen few months ago at [facility] and underwent a dual-chamber pacemaker manufactured by [employer] in 2/2020.  She continues to have issues with  blood pressure and exertional shortness of breath without any leg edema or syncope  She denies any angina or leg edema  She is very compliant with medications  She is currently on calcium channel blockers and anticoagulation   Patient symptoms somewhat improved since last visit with lesser episodes of atrial fibrillation according to her and currently she is on sotalol 80 mg twice daily  She complains of class III dyspnea which is chronic in nature and orthostatic symptoms and she is taking hydrochlorothiazide 3 times a week  She complains of intermittent palpitations  Hydrochlorothiazide has been stopped  She has noted increased blood pressure  Is in for follow-up with occasional palpitations    Past history is verified and attached and unchanged  Past Medical History:   Diagnosis Date   • A-fib (CMS/[facility])     dr. mtz- seen in hospital and has f/u in office in may, 2020   • Cancer (CMS/[facility]) 2013    HX breast cancer   • Constipation    • Coronary artery disease     dr. mtz   • Dyspnea    • Hypertension    • Macular degeneration of both eyes    • Varicose vein of leg    • Vomiting bile     occassionally       Physical Exam  VITALS REVIEWED    General:      well developed, well nourished, in no acute distress.    Head:      normocephalic and atraumatic.    Eyes:      PERRL/EOM intact, conjunctiva and sclera clear with out nystagmus.    Neck:      no  thyromegaly,  trachea central with normal respiratory effort and PMI displaced laterally  Lungs:      clear bilaterally to auscultation.    Heart:       underlying atrial fibrillation   without any murmurs gallops or rubs  Msk:      no deformity or  scoliosis noted of thoracic or lumbar spine.    Pulses:      pulses normal in all 4 extremities.    Extremities:       no cyanosis or clubbing--trace left pedal edema and trace right pedal edema.    Neurologic:      no focal deficits.   alert oriented x3  Skin:      intact without lesions or rashes.    Psych:      alert and cooperative; normal mood and affect; normal attention span and concentration.          Assessment and plan    Class II diastolic heart failure symptoms ongoing  Hypertensive heart disease with prior history of mild hypokalemia and hypertension not well controlled and off hydrochlorothiazide  Stop potassium  Start Aldactone 25 mg once daily  Follow-up BMP since Aldactone is been initiated  Follow-up appointments made  Sick sinus syndrome  Paroxysmal atrial fibrillation noted up to 8% on pacemaker interrogation  Dual-chamber pacemaker in situ      ECG 12 Lead    Date/Time: 1/11/2021 6:19 PM  Performed by: Marcus Schultz MD  Authorized by: Marcus Schultz MD   Comparison: compared with previous ECG   Comparison to previous ECG: Compared to prior EKG when patient is in A. fib currently in sinus rhythm with atrial pacing  Rhythm: sinus rhythm  Rate: normal  QRS axis: normal            Electronically signed by Marcus Schultz MD, 01/11/21, 6:19 PM EST.

## 2021-02-24 PROCEDURE — 93294 REM INTERROG EVL PM/LDLS PM: CPT | Performed by: INTERNAL MEDICINE

## 2021-02-24 PROCEDURE — 93296 REM INTERROG EVL PM/IDS: CPT | Performed by: INTERNAL MEDICINE

## 2024-10-18 ENCOUNTER — APPOINTMENT (OUTPATIENT)
Dept: GENERAL RADIOLOGY | Facility: HOSPITAL | Age: 89
End: 2024-10-18
Payer: MEDICARE

## 2024-10-18 ENCOUNTER — HOSPITAL ENCOUNTER (OUTPATIENT)
Facility: HOSPITAL | Age: 89
Discharge: HOME | End: 2024-10-18
Attending: EMERGENCY MEDICINE | Admitting: EMERGENCY MEDICINE
Payer: MEDICARE

## 2024-10-18 VITALS
HEIGHT: 64 IN | RESPIRATION RATE: 20 BRPM | OXYGEN SATURATION: 94 % | BODY MASS INDEX: 24.79 KG/M2 | TEMPERATURE: 99 F | SYSTOLIC BLOOD PRESSURE: 158 MMHG | HEART RATE: 72 BPM | WEIGHT: 145.2 LBS | DIASTOLIC BLOOD PRESSURE: 78 MMHG

## 2024-10-18 DIAGNOSIS — J44.9 CHRONIC OBSTRUCTIVE PULMONARY DISEASE, UNSPECIFIED COPD TYPE: ICD-10-CM

## 2024-10-18 DIAGNOSIS — R05.1 ACUTE COUGH: Primary | ICD-10-CM

## 2024-10-18 PROCEDURE — G0463 HOSPITAL OUTPT CLINIC VISIT: HCPCS

## 2024-10-18 PROCEDURE — 99214 OFFICE O/P EST MOD 30 MIN: CPT

## 2024-10-18 PROCEDURE — 71046 X-RAY EXAM CHEST 2 VIEWS: CPT

## 2024-10-18 RX ORDER — ALBUTEROL SULFATE 90 UG/1
2 INHALANT RESPIRATORY (INHALATION) EVERY 6 HOURS PRN
Qty: 8 G | Refills: 0 | Status: SHIPPED | OUTPATIENT
Start: 2024-10-18

## 2024-10-18 RX ORDER — CEFUROXIME AXETIL 500 MG/1
500 TABLET ORAL 2 TIMES DAILY
Qty: 10 TABLET | Refills: 0 | Status: SHIPPED | OUTPATIENT
Start: 2024-10-18 | End: 2024-10-23

## 2024-10-18 NOTE — DISCHARGE INSTRUCTIONS
Follow-up with your primary care in a week for evaluation, and to review your medication for COPD.    Increase your hydration.    Use your inhaler with spacer for cough.

## 2024-10-18 NOTE — FSED PROVIDER NOTE
Subjective   History of Present Illness  Patient is a pleasant 88-year-old female with history of COPD, A-fib, hypertension who complains of a productive cough for 2 days with yellow sputum.  She denies ill person exposure, chest pain, shortness of air, nausea vomiting diarrhea.        Review of Systems   Respiratory:  Positive for cough.    All other systems reviewed and are negative.      Past Medical History:   Diagnosis Date    A-fib     dr. mtz- seen in hospital and has f/u in office in may, 2020    Cancer 2013    HX breast cancer    Constipation     Coronary artery disease     dr. mtz    Dyspnea     Hypertension     Macular degeneration of both eyes     Varicose vein of leg     Vomiting bile     occassionally       Allergies   Allergen Reactions    Prednisone Anaphylaxis    Wheat GI Intolerance    Codeine Nausea Only    Hydrocodone Itching    [place] Allergy Skin Test Rash       Past Surgical History:   Procedure Laterality Date    BREAST LUMPECTOMY Right     ENDOSCOPY N/A 4/30/2020    Procedure: ESOPHAGOGASTRODUODENOSCOPY WITH DILATATION (BOUGIE #48,52);  Surgeon: Jeovanny Jonas MD;  Location: [facility] ENDOSCOPY;  Service: Gastroenterology;  Laterality: N/A;  post procedure diagnosis: ESOPHAGITIS    HYSTERECTOMY      partial    PACEMAKER IMPLANTATION  02/13/2020    at [place]       History reviewed. No pertinent family history.    Social History     Socioeconomic History    Marital status:    Tobacco Use    Smoking status: Never     Passive exposure: Past    Smokeless tobacco: Never   Vaping Use    Vaping status: Never Used   Substance and Sexual Activity    Alcohol use: Never    Drug use: Never    Sexual activity: Defer           Objective   Physical Exam  Vitals and nursing note reviewed.   Constitutional:       General: She is not in acute distress.     Appearance: Normal appearance. She is not ill-appearing, toxic-appearing or diaphoretic.   HENT:      Head: Normocephalic.      Right  Ear: Tympanic membrane normal.      Left Ear: Tympanic membrane normal.      Nose: Nose normal.      Mouth/Throat:      Mouth: Mucous membranes are moist.   Eyes:      Conjunctiva/sclera: Conjunctivae normal.      Pupils: Pupils are equal, round, and reactive to light.   Cardiovascular:      Rate and Rhythm: Normal rate and regular rhythm.      Pulses: Normal pulses.      Heart sounds: Normal heart sounds.   Pulmonary:      Effort: Pulmonary effort is normal.      Breath sounds: Normal breath sounds.   Musculoskeletal:         General: Normal range of motion.      Cervical back: Normal range of motion.   Neurological:      General: No focal deficit present.      Mental Status: She is alert.         Procedures           ED Course  ED Course as of 10/18/24 1710   Fri Oct 18, 2024   1705 XR CHEST 2 VW     Date of Exam: 10/18/2024 11:40 AM EDT     Indication: cough     Comparison: Two-view chest x-ray 11/29/2023 , 6/8/2021     Findings:   There is stable linear scarring in the right lower lung. Lungs otherwise appear clear. No consolidation or pleural effusion is seen. Cardiomediastinal contours and pacemaker leads appear stable.     IMPRESSION:  Impression:  No acute cardiopulmonary abnormality is identified.      [CW]      ED Course User Index  [CW] Brinda Shrestha, APRN                                           Medical Decision Making  Patient is a 88-year-old female accompanied with family with complaints of a productive cough with yellow sputum for 2 days.  Differential includes COPD exacerbation, pneumonia, URI.  Upon exam patient is awake and alert and able to answer questions appropriately.  She appears in no distress.  Her lung sounds are clear and equal bilaterally.  She has a regular heart rate and rhythm.  Her mucous membranes are moist.  Tongue is midline with no exudate or erythema present.  Her ear canal and TM are normal.  I have completed a two-view chest x-ray which was unremarkable.  Due to her  COPD patient requests I have sent Ceftin 1 500 mg tablet twice a day for 5 days.  I have advised her to follow-up with her primary care in a week for recheck.  I have advised her to return to the ER with any worsening of symptoms such as shortness of air, chest pain, nausea, vomiting or with any new concerns.    Problems Addressed:  Acute cough: complicated acute illness or injury  Chronic obstructive pulmonary disease, unspecified COPD type: complicated acute illness or injury    Amount and/or Complexity of Data Reviewed  Radiology: ordered.    Risk  Prescription drug management.        Final diagnoses:   Acute cough   Chronic obstructive pulmonary disease, unspecified COPD type       ED Disposition  ED Disposition       ED Disposition   Discharge    Condition   Stable    Comment   --               PATIENT CONNECTION - [facility]  [facility] [address]  [phone]  Schedule an appointment as soon as possible for a visit in 1 week  If symptoms worsen, As needed         Medication List        New Prescriptions      cefuroxime 500 MG tablet  Commonly known as: CEFTIN  Take 1 tablet by mouth 2 (Two) Times a Day for 5 days.            Changed      albuterol sulfate  (90 Base) MCG/ACT inhaler  Commonly known as: PROVENTIL HFA;VENTOLIN HFA;PROAIR HFA  Inhale 2 puffs Every 6 (Six) Hours As Needed for Wheezing.  What changed: when to take this               Where to Get Your Medications        These medications were sent to [facility] [id number] - [facility] [place] - [facility] - [phone]  - [phone] [address] [place] [address]      Phone: [phone]   albuterol sulfate  (90 Base) MCG/ACT inhaler  cefuroxime 500 MG tablet

## (undated) DEVICE — PK ENDO GI 50

## (undated) DEVICE — PAPR PRNT PK SONY W RIBN UPC55

## (undated) DEVICE — BITEBLOCK ENDO W/STRAP 60F A/ LF DISP